# Patient Record
Sex: FEMALE | Race: WHITE | NOT HISPANIC OR LATINO | URBAN - METROPOLITAN AREA
[De-identification: names, ages, dates, MRNs, and addresses within clinical notes are randomized per-mention and may not be internally consistent; named-entity substitution may affect disease eponyms.]

---

## 2017-07-11 ENCOUNTER — IMPORTED ENCOUNTER (OUTPATIENT)
Dept: URBAN - METROPOLITAN AREA CLINIC 1 | Facility: CLINIC | Age: 82
End: 2017-07-11

## 2017-07-11 PROBLEM — H43.812: Noted: 2017-07-11

## 2017-07-11 PROBLEM — E11.9: Noted: 2017-07-11

## 2017-07-11 PROBLEM — H04.123: Noted: 2017-07-11

## 2017-07-11 PROBLEM — Z79.84: Noted: 2017-07-11

## 2017-07-11 PROBLEM — H25.813: Noted: 2017-07-11

## 2017-07-11 PROCEDURE — 92015 DETERMINE REFRACTIVE STATE: CPT

## 2017-07-11 PROCEDURE — 92014 COMPRE OPH EXAM EST PT 1/>: CPT

## 2017-07-11 NOTE — PATIENT DISCUSSION
1.  DM Type II without sign of diabetic retinopathy and no blot heme on dilated retinal examination today OU No Macular Edema:  Discussed the pathophysiology of diabetes and its effect on the eye and risk of blindness. Stressed the importance of strong glucose control. Advised of importance of at least yearly dilated examinations but to contact us immediately for any problems or concerns. 2. Type II diabetes controlled by oral medications. 3.  Cataract OU:  Visually Significant discussed the risks benefits alternatives and limitations of cataract surgery. The patient stated a full understanding and a desire to proceed with the procedure. The patient will need to return for preop appointment with cataract measurements and to have any additional questions answered and start pre-operative eye drops as directed. Patient interested in having near vision corrected. Patient did not see PMG. Phaco PCLOtherwise follow-up4. Dry Eyes OU -- Recommended to patient to use Artificial Tears BID OU5. PVD w/o Tear OS - Patient was cautioned to call our office immediately if they experience   a substantial change in their symptoms such as an increase in floaters persistent flashes loss of visual field (may appear as a shadow or a curtain) or decrease in visual acuity as these may indicate a retinal tear or detachment. 6.  Return for an appointment for H and P. with Dr. Ernst Harvey.

## 2017-08-21 ENCOUNTER — IMPORTED ENCOUNTER (OUTPATIENT)
Dept: URBAN - METROPOLITAN AREA CLINIC 1 | Facility: CLINIC | Age: 82
End: 2017-08-21

## 2017-08-21 PROBLEM — H18.413: Noted: 2017-08-21

## 2017-08-21 PROBLEM — E11.3393: Noted: 2017-08-21

## 2017-08-21 PROBLEM — H04.123: Noted: 2017-08-21

## 2017-08-21 PROBLEM — H25.813: Noted: 2017-08-21

## 2017-08-21 PROBLEM — Z79.84: Noted: 2017-08-21

## 2017-08-21 PROBLEM — H16.143: Noted: 2017-08-21

## 2017-08-21 PROBLEM — H40.023: Noted: 2017-08-21

## 2017-08-21 PROCEDURE — 92136 OPHTHALMIC BIOMETRY: CPT

## 2017-08-21 NOTE — PATIENT DISCUSSION
1.  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 Stressed the importance of strong glucose control. Advised of importance of at least yearly dilated examinations but to contact us immediately for any problems or concerns. 2. Type II diabetes controlled by oral medications. 3.  Glaucoma Suspect OU :(.75 OD/ .5 OS) W/u after phaco. Patient is considered high risk. Condition was discussed with patient and patient understands. Will continue to monitor patient for any progression in condition. Patient was advised to call us with any problems questions or concerns. 4.  MARIELA w/ PEK OU-The continuation of artificial tears were recommended. 5.  Arcus OU6. Cataract OU:  Visually Significant secondary to glare  discussed the risks benefits alternatives and limitations of cataract surgery. The patient stated a full understanding and a desire to proceed with the procedure. Cataract measurements  obtained today additional questions answered and patient aware to start pre-operative eye drops as directed. Patient wants to be nearsighted post phacoPhaco PCL OS then OD7. Return for an appointment for Return as scheduled with Dr. Halley Cornelius.

## 2017-08-30 ENCOUNTER — IMPORTED ENCOUNTER (OUTPATIENT)
Dept: URBAN - METROPOLITAN AREA CLINIC 1 | Facility: CLINIC | Age: 82
End: 2017-08-30

## 2017-08-31 ENCOUNTER — IMPORTED ENCOUNTER (OUTPATIENT)
Dept: URBAN - METROPOLITAN AREA CLINIC 1 | Facility: CLINIC | Age: 82
End: 2017-08-31

## 2017-08-31 PROBLEM — Z96.1: Noted: 2017-08-31

## 2017-08-31 PROCEDURE — 99024 POSTOP FOLLOW-UP VISIT: CPT

## 2017-09-07 ENCOUNTER — IMPORTED ENCOUNTER (OUTPATIENT)
Dept: URBAN - METROPOLITAN AREA CLINIC 1 | Facility: CLINIC | Age: 82
End: 2017-09-07

## 2017-09-07 PROBLEM — H25.811: Noted: 2017-09-07

## 2017-09-07 PROBLEM — Z96.1: Noted: 2017-09-07

## 2017-09-07 PROCEDURE — 99024 POSTOP FOLLOW-UP VISIT: CPT

## 2017-09-07 NOTE — PATIENT DISCUSSION
1.  Cataract OD: Visually Significant secondary to glare discussed the risks benefits alternatives and limitations of cataract surgery. The patient stated a full understanding but wishes to post pone 2nd eye sx at this time. Thoroughly explained to pt that the 'spaces' pt sees in [place] is likely from NPDR. Explained to pt there is no advantage of putting off 2nd eye sx. Advised pt to remove OS lens of spectacles to help [facility] in the interim. 2.  POW#1  CE/IOL Standard w/ Lensx OS doing well. Discontinue OcufloxContinue Durezol BID until gone. Continue Ilevro QD until gone. Cancel sx OD at pt's request. Return for KR OS as scheduled 10/05/17 w/ PMG.

## 2017-10-05 ENCOUNTER — IMPORTED ENCOUNTER (OUTPATIENT)
Dept: URBAN - METROPOLITAN AREA CLINIC 1 | Facility: CLINIC | Age: 82
End: 2017-10-05

## 2017-10-05 PROBLEM — Z96.1: Noted: 2017-10-05

## 2017-10-05 PROCEDURE — 99024 POSTOP FOLLOW-UP VISIT: CPT

## 2017-10-05 NOTE — PATIENT DISCUSSION
1. POW#1  CE/IOL Standard w/ Lensx OS doing well. Continue Lotemax/Durezol/Prednisolone BID until gone. Continue Prolensa/Ilevro/Acular QD until gone. 2. Cataract OD: Visually Significant secondary to glare discussed the risks benefits alternatives and limitations of cataract surgery. The patient stated a full understanding and now wishes t proceed w/ phaco/PCL OD. 3. Return for an appointment for H&P OD in near future with Dr. Daniel Leahy.

## 2017-11-27 ENCOUNTER — IMPORTED ENCOUNTER (OUTPATIENT)
Dept: URBAN - METROPOLITAN AREA CLINIC 1 | Facility: CLINIC | Age: 82
End: 2017-11-27

## 2017-11-27 PROBLEM — H25.811: Noted: 2017-11-27

## 2017-11-27 PROCEDURE — 92136 OPHTHALMIC BIOMETRY: CPT

## 2017-11-27 NOTE — PATIENT DISCUSSION
Cataract OD: Visually Significant secondary to glare discussed the risks benefits alternatives and limitations of cataract surgery. The patient stated a full understanding and a desire to proceed with the procedure. The patient will need to start pre-operative eye drops as directed. *Pt desires to be set at nearProceed w/ phaco PCL OSPt understands they will need glasses post-op to achieve their best corrected vision. Return for an appointment for sx OD with Dr. Nasrin Rao.

## 2017-12-06 ENCOUNTER — IMPORTED ENCOUNTER (OUTPATIENT)
Dept: URBAN - METROPOLITAN AREA CLINIC 1 | Facility: CLINIC | Age: 82
End: 2017-12-06

## 2017-12-07 ENCOUNTER — IMPORTED ENCOUNTER (OUTPATIENT)
Dept: URBAN - METROPOLITAN AREA CLINIC 1 | Facility: CLINIC | Age: 82
End: 2017-12-07

## 2017-12-07 PROBLEM — Z96.1: Noted: 2017-12-07

## 2017-12-07 PROCEDURE — 99024 POSTOP FOLLOW-UP VISIT: CPT

## 2017-12-07 NOTE — PATIENT DISCUSSION
POD#1 CE/IOL Standard w/ LenSx OD doing well. Continue all 3 gtts as prescribed and until gone. Ocuflox TIDDurezol [facility] QDPost op Warnings Reiterated RTC as scheduled for Juan Jackson

## 2017-12-22 ENCOUNTER — IMPORTED ENCOUNTER (OUTPATIENT)
Dept: URBAN - METROPOLITAN AREA CLINIC 1 | Facility: CLINIC | Age: 82
End: 2017-12-22

## 2017-12-22 PROBLEM — Z96.1: Noted: 2017-12-22

## 2017-12-22 PROCEDURE — 99024 POSTOP FOLLOW-UP VISIT: CPT

## 2017-12-22 NOTE — PATIENT DISCUSSION
1. POM#1 CE/IOL OD (Standard w/ LenSx) doing well. Use Durezol BID OD till out Use Ilevro Qdaily OD till out2. CE/IOL Standard w/ Lensx OSReturn for an appointment in 3-4 months 30/OCT with Dr. Meeta Gavin.

## 2018-03-12 NOTE — PATIENT DISCUSSION
Cataract Monitor Counseling:   I have discussed continuing with current spectacles vs updating. Return to follow up as scheduled or sooner if symptoms arise.

## 2018-05-03 ENCOUNTER — IMPORTED ENCOUNTER (OUTPATIENT)
Dept: URBAN - METROPOLITAN AREA CLINIC 1 | Facility: CLINIC | Age: 83
End: 2018-05-03

## 2018-05-03 PROBLEM — H43.812: Noted: 2018-05-03

## 2018-05-03 PROBLEM — Z96.1: Noted: 2018-05-03

## 2018-05-03 PROBLEM — H40.013: Noted: 2018-05-03

## 2018-05-03 PROBLEM — Z79.84: Noted: 2018-05-03

## 2018-05-03 PROBLEM — E11.3393: Noted: 2018-05-03

## 2018-05-03 PROBLEM — H04.123: Noted: 2018-05-03

## 2018-05-03 PROBLEM — H16.143: Noted: 2018-05-03

## 2018-05-03 PROCEDURE — 92133 CPTRZD OPH DX IMG PST SGM ON: CPT

## 2018-05-03 PROCEDURE — 92014 COMPRE OPH EXAM EST PT 1/>: CPT

## 2018-05-03 NOTE — PATIENT DISCUSSION
1.  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 Discussed the pathophysiology of diabetes and its effect on the eye and risk of blindness. Stressed the importance of strong glucose control. Advised of importance of at least yearly dilated examinations but to contact us immediately for any problems or concerns. 2. Type II diabetes controlled by oral medications. 3.  Glaucoma Suspect OU : Stable IOP and C/D OU. Normal OCT OU. Patient is considered Low Risk. Condition was discussed with patient and patient understands. Will continue to monitor patient for any progression in condition. Patient was advised to call us with any problems questions or concerns. 4.  MARIELA w/ PEK OU- Progressed OU. The increase of artificial tears OU to QID were recommended. 5.  PVD w/o Tear OS- Old stable. 6.  Pseudophakia OU (Standard / LenSx OU)- Doing well. 7. Return for an appointment for a dfe in 6 months with Dr. Jen Felix.

## 2018-05-03 NOTE — PATIENT DISCUSSION
Glaucoma Suspect OU :  Patient is considered Low Risk. Condition was discussed with patient and patient understands. Will continue to monitor patient for any progression in condition. Patient was advised to call us with any problems questions or concerns.

## 2019-01-22 ENCOUNTER — IMPORTED ENCOUNTER (OUTPATIENT)
Dept: URBAN - METROPOLITAN AREA CLINIC 1 | Facility: CLINIC | Age: 84
End: 2019-01-22

## 2019-01-22 PROBLEM — H16.143: Noted: 2019-01-22

## 2019-01-22 PROBLEM — H43.812: Noted: 2019-01-22

## 2019-01-22 PROBLEM — H04.123: Noted: 2019-01-22

## 2019-01-22 PROBLEM — E11.3393: Noted: 2019-01-22

## 2019-01-22 PROBLEM — Z96.1: Noted: 2019-01-22

## 2019-01-22 PROBLEM — Z79.84: Noted: 2019-01-22

## 2019-01-22 PROBLEM — H40.013: Noted: 2019-01-22

## 2019-01-22 PROCEDURE — 99213 OFFICE O/P EST LOW 20 MIN: CPT

## 2019-01-22 NOTE — PATIENT DISCUSSION
1.  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 Discussed the pathophysiology of diabetes and its effect on the eye and risk of blindness. Stressed the importance of strong glucose control. Advised of importance of at least yearly dilated examinations but to contact us immediately for any problems or concerns. 2. Type II diabetes controlled by oral medications. 3.  Glaucoma Suspect OU(0.75/0.5): Stable IOP and C/D OU. Past w/u (-). Patient is considered Low Risk. Condition was discussed with patient and patient understands. Will continue to monitor patient for any progression in condition. Patient was advised to call us with any problems questions or concerns. 4.  MARIELA w/ PEK OU- Urged compliance w/ tears OU QID routinely. 5.  PVD w/o Tear OS- Old stable. 6.  Pseudophakia OU (Standard / LenSx OU)- Doing well. 7. Return for an appointment for 30/OCT in 6 months with Dr. Dagoberto Kenyon.

## 2019-07-25 ENCOUNTER — IMPORTED ENCOUNTER (OUTPATIENT)
Dept: URBAN - METROPOLITAN AREA CLINIC 1 | Facility: CLINIC | Age: 84
End: 2019-07-25

## 2019-07-25 PROBLEM — Z79.84: Noted: 2019-07-25

## 2019-07-25 PROBLEM — H43.812: Noted: 2019-07-25

## 2019-07-25 PROBLEM — H26.493: Noted: 2019-07-25

## 2019-07-25 PROBLEM — E11.3393: Noted: 2019-07-25

## 2019-07-25 PROBLEM — H04.123: Noted: 2019-07-25

## 2019-07-25 PROBLEM — H40.013: Noted: 2019-07-25

## 2019-07-25 PROBLEM — H16.143: Noted: 2019-07-25

## 2019-07-25 PROBLEM — Z96.1: Noted: 2019-07-25

## 2019-07-25 PROCEDURE — 92014 COMPRE OPH EXAM EST PT 1/>: CPT

## 2019-07-25 PROCEDURE — 92133 CPTRZD OPH DX IMG PST SGM ON: CPT

## 2019-07-25 NOTE — PATIENT DISCUSSION
1.  DM Type II (Oral Medication) with Moderate Nonproliferative Diabetic Retinopathy OU No Macular Edema:  Discussed the pathophysiology of diabetes and its effect on the eye and risk of blindness. Stressed the importance of strong glucose control. Advised of importance of at least yearly dilated examinations but to contact us immediately for any problems or concerns. 2. Glaucoma Suspect OU (CD 0.75/0.50): No progression by OCT. IOP stable. Patient is considered Low Risk. Condition was discussed with patient and patient understands. Will continue to monitor patient for any progression in condition. Patient was advised to call us with any problems questions or concerns. 3.  MARIELA w/ PEK OU- Recommend ATs TID OU routinely 4. PCO OU: (Posterior Capsule Opacification)   Observe and consider yag cap when pt feels pco visually significant and visual acuity decreases to appropriate level. 5. Pseudophakia OU - (Standard / Carly Jaison. PVD w/o Tear OS- RD precautions. Patient defers the refraction at today's visitReturn for an appointment in 6 months 10/DFE with Dr. Halley Cornelius.

## 2020-01-30 ENCOUNTER — IMPORTED ENCOUNTER (OUTPATIENT)
Dept: URBAN - METROPOLITAN AREA CLINIC 1 | Facility: CLINIC | Age: 85
End: 2020-01-30

## 2020-01-30 PROBLEM — Z79.84: Noted: 2020-01-30

## 2020-01-30 PROBLEM — H40.013: Noted: 2020-01-30

## 2020-01-30 PROBLEM — E11.3493: Noted: 2020-01-30

## 2020-01-30 PROCEDURE — 92012 INTRM OPH EXAM EST PATIENT: CPT

## 2020-01-30 NOTE — PATIENT DISCUSSION
1.  DM Type II (Oral Meds) with Severe Nonproliferative Diabetic Retinopathy OU No Macular Edema: **Slight progression OU noted today** Discussed the pathophysiology of diabetes and its effect on the eye and risk of blindness. Stressed the importance of strong glucose control. Advised of importance of at least yearly dilated examinations but to contact us immediately for any problems or concerns. 2. Glaucoma Suspect OU (CD 0.75/0.50) IOP WNL OU today on no gtts; Past w/u neg. Cont to observe off gtts. 3.  MARIELA w/ PEK OU- Cont ATs TID OU routinely 4. PCO OU: (Posterior Capsule Opacification)   Observe  5. Pseudophakia OU - (Standard / Elidia Proper. PVD w/o Tear OS- Stable RD precautions. Return for an appointment in 4 mo 10 dfe glare with Dr. Roosevelt Louis. Complex Repair And Single Advancement Flap Text: The defect edges were debeveled with a #15 scalpel blade.  The primary defect was closed partially with a complex linear closure.  Given the location of the remaining defect, shape of the defect and the proximity to free margins a single advancement flap was deemed most appropriate for complete closure of the defect.  Using a sterile surgical marker, an appropriate advancement flap was drawn incorporating the defect and placing the expected incisions within the relaxed skin tension lines where possible.    The area thus outlined was incised deep to adipose tissue with a #15 scalpel blade.  The skin margins were undermined to an appropriate distance in all directions utilizing iris scissors.

## 2020-02-19 ENCOUNTER — IMPORTED ENCOUNTER (OUTPATIENT)
Dept: URBAN - METROPOLITAN AREA CLINIC 1 | Facility: CLINIC | Age: 85
End: 2020-02-19

## 2020-02-19 PROCEDURE — 92015 DETERMINE REFRACTIVE STATE: CPT

## 2020-02-19 NOTE — PATIENT DISCUSSION
Pt presents today for refraction only. Ok to finalize print and release to pt per RBF. Return for an appointment for Return as scheduled 10/DFE/luan with Dr. Ramiro Ng.

## 2020-05-19 ENCOUNTER — IMPORTED ENCOUNTER (OUTPATIENT)
Dept: URBAN - METROPOLITAN AREA CLINIC 1 | Facility: CLINIC | Age: 85
End: 2020-05-19

## 2020-05-19 PROBLEM — H40.013: Noted: 2020-05-19

## 2020-05-19 PROBLEM — Z79.84: Noted: 2020-05-19

## 2020-05-19 PROBLEM — H04.123: Noted: 2020-05-19

## 2020-05-19 PROBLEM — H26.493: Noted: 2020-05-19

## 2020-05-19 PROBLEM — Z79.4: Noted: 2020-05-19

## 2020-05-19 PROBLEM — E11.3493: Noted: 2020-05-19

## 2020-05-19 PROBLEM — H16.143: Noted: 2020-05-19

## 2020-05-19 PROCEDURE — 92012 INTRM OPH EXAM EST PATIENT: CPT

## 2020-05-19 PROCEDURE — 92015 DETERMINE REFRACTIVE STATE: CPT

## 2020-05-19 NOTE — PATIENT DISCUSSION
1.  DM Type II (Oral Med) with Severe Nonproliferative Diabetic Retinopathy OU No Macular Edema:  Stable OU. Discussed the pathophysiology of diabetes and its effect on the eye and risk of blindness. Stressed the importance of strong glucose control. Advised of importance of at least yearly dilated examinations but to contact us immediately for any problems or concerns. 2. PCO OU -- Visually Significant *secondary to glare*; schedule YAG Cap OD then OS. Pros cons risks and benefits. 3.  MARIELA w/ PEK OU --  Increase ATs QID OU routinely 4. Glaucoma Suspect OU (CD 0.75/0.50) IOP WNL OU today on no gtts; Past w/u neg. Cont to observe off gtts. ** Plan to repeat OCT after YAG OU.5. Pseudophakia OU - (Standard / Steve Anton. PVD w/o Tear OS- Stable RD precautions. Return for an appointment in YAG Cap OD then OS with Dr. Nasrin Rao.

## 2020-06-05 ENCOUNTER — IMPORTED ENCOUNTER (OUTPATIENT)
Dept: URBAN - METROPOLITAN AREA CLINIC 1 | Facility: CLINIC | Age: 85
End: 2020-06-05

## 2020-06-05 PROBLEM — H26.491: Noted: 2020-06-05

## 2020-06-05 PROCEDURE — 66821 AFTER CATARACT LASER SURGERY: CPT

## 2020-06-05 NOTE — PATIENT DISCUSSION
YAG CAP OD: (Consent signed and scanned into attachments) 1 gtt Prolensa applied. The purpose and nature of the procedure possible alternative methods of treatment the risks involved and the possibility of complications were discussed with patient. The Patient wishes to proceed and the consent was signed. The laser was then performed under topical anesthesia with no complications. Post op instructions were given to patient as well as a follow-up appointment. Patient was advised to call our office if any questions or concerns. Return for an appointment for Return as scheduled with Dr. Jud Bojorquez.

## 2020-06-19 ENCOUNTER — IMPORTED ENCOUNTER (OUTPATIENT)
Dept: URBAN - METROPOLITAN AREA CLINIC 1 | Facility: CLINIC | Age: 85
End: 2020-06-19

## 2020-06-19 PROBLEM — H26.492: Noted: 2020-06-19

## 2020-06-19 PROCEDURE — 66821 AFTER CATARACT LASER SURGERY: CPT

## 2020-06-19 NOTE — PATIENT DISCUSSION
YAG CAP OS: (Consent signed and scanned into attachments) 1 gtt Prolensa applied. The purpose and nature of the procedure possible alternative methods of treatment the risks involved and the possibility of complications were discussed with patient. The Patient wishes to proceed and the consent was signed. The laser was then performed under topical anesthesia with no complications. Post op instructions were given to patient as well as a follow-up appointment. Patient was advised to call our office if any questions or concerns. Return for an appointment in 1-2 week po/yag with Dr. Meeta Gavin.

## 2020-07-28 ENCOUNTER — IMPORTED ENCOUNTER (OUTPATIENT)
Dept: URBAN - METROPOLITAN AREA CLINIC 1 | Facility: CLINIC | Age: 85
End: 2020-07-28

## 2020-07-28 PROCEDURE — 99024 POSTOP FOLLOW-UP VISIT: CPT

## 2020-07-28 NOTE — PATIENT DISCUSSION
PO YAG Cap OU: good result. MRX given. Return for an appointment in November 30/Ptosis HVF with Dr. Dagoberto Kenyon.

## 2020-12-04 ENCOUNTER — IMPORTED ENCOUNTER (OUTPATIENT)
Dept: URBAN - METROPOLITAN AREA CLINIC 1 | Facility: CLINIC | Age: 85
End: 2020-12-04

## 2020-12-04 PROBLEM — H02.423: Noted: 2020-12-04

## 2020-12-04 PROBLEM — Z79.84: Noted: 2020-12-04

## 2020-12-04 PROBLEM — E11.3493: Noted: 2020-12-04

## 2020-12-04 PROBLEM — H40.023: Noted: 2020-12-04

## 2020-12-04 PROCEDURE — 92014 COMPRE OPH EXAM EST PT 1/>: CPT

## 2020-12-04 PROCEDURE — 92083 EXTENDED VISUAL FIELD XM: CPT

## 2020-12-04 NOTE — PATIENT DISCUSSION
1.  DM Type II (Oral Med) with Severe Nonproliferative Diabetic Retinopathy OU No Macular Edema: Stable OU. Discussed the pathophysiology of diabetes and its effect on the eye and risk of blindness. Stressed the importance of strong glucose control. Advised of importance of at least yearly dilated examinations but to contact us immediately for any problems or concerns. 2. Glaucoma Suspect OU (CD 0.75/0.50) IOP WNL OU today on no gtts; HVF non specific defects OU. Cont to observe off gtts. ** Plan to repeat OCT after YAG OU.3. Myogenic Ptosis OU -- VF reveals not visually significant at this time. Will continue to observe. 4.  Pseudophakia OU - (Standard / LenSx OU) H/o YAG Cap OU. 5.  PVD w/o Tear OS- Stable RD precautions. Return for an appointment in 4 months fora  10/DFE with Dr. Bernardo Anaya.

## 2021-05-03 ENCOUNTER — IMPORTED ENCOUNTER (OUTPATIENT)
Dept: URBAN - METROPOLITAN AREA CLINIC 1 | Facility: CLINIC | Age: 86
End: 2021-05-03

## 2021-05-03 PROBLEM — Z79.84: Noted: 2021-05-03

## 2021-05-03 PROBLEM — E11.3493: Noted: 2021-05-03

## 2021-05-03 PROBLEM — H40.013: Noted: 2021-05-03

## 2021-05-03 PROCEDURE — 99213 OFFICE O/P EST LOW 20 MIN: CPT

## 2021-05-03 PROCEDURE — 92133 CPTRZD OPH DX IMG PST SGM ON: CPT

## 2021-05-03 NOTE — PATIENT DISCUSSION
1.  DM Type II (Oral Medication) with Severe Nonproliferative Diabetic Retinopathy OU No Macular Edema:  Discussed the pathophysiology of diabetes and its effect on the eye and risk of blindness. Stressed the importance of strong glucose control. Advised of importance of at least yearly dilated examinations but to contact us immediately for any problems or concerns. 2. Glaucoma Suspect OU (CD 0.75/0.50): No progression by OCT. IOP stable. Patient is considered Low Risk. Condition was discussed with patient and patient understands. Will continue to monitor patient for any progression in condition. Patient was advised to call us with any problems questions or concerns. 3.  Myogenic Ptosis OU - Will continue to observe. Will repeat VF testing at next visit 4. Pseudophakia OU - (Standard / LenSx OU) H/o YAG Cap OU. 5.  Dry Eyes w/ PEK OU - Recommend ATs TID OU routinely 6. Choroidal Nevus OS - Observe 7. PVD w/o Tear OS- Stable RD precautions. Return for an appointment in 4 months DFE/Ptosis VF with Dr. Sydney Winters.

## 2021-12-10 ENCOUNTER — IMPORTED ENCOUNTER (OUTPATIENT)
Dept: URBAN - METROPOLITAN AREA CLINIC 1 | Facility: CLINIC | Age: 86
End: 2021-12-10

## 2021-12-10 PROBLEM — H16.143: Noted: 2021-12-10

## 2021-12-10 PROBLEM — H04.123: Noted: 2021-12-10

## 2021-12-10 PROBLEM — H43.812: Noted: 2021-12-10

## 2021-12-10 PROBLEM — H40.013: Noted: 2021-12-10

## 2021-12-10 PROBLEM — Z96.1: Noted: 2021-12-10

## 2021-12-10 PROBLEM — D31.32: Noted: 2021-12-10

## 2021-12-10 PROBLEM — H02.831: Noted: 2021-12-10

## 2021-12-10 PROBLEM — E11.3493: Noted: 2021-12-10

## 2021-12-10 PROBLEM — H02.834: Noted: 2021-12-10

## 2021-12-10 PROBLEM — E11.3393: Noted: 2021-12-10

## 2021-12-10 PROBLEM — Z79.84: Noted: 2021-12-10

## 2021-12-10 PROCEDURE — 99214 OFFICE O/P EST MOD 30 MIN: CPT

## 2021-12-10 NOTE — PATIENT DISCUSSION
DM Type II with Severe Nonproliferative Diabetic Retinopathy OU No Macular Edema:  Discussed the pathophysiology of diabetes and its effect on the eye and risk of blindness. Stressed the importance of strong glucose control. Advised of importance of at least yearly dilated examinations but to contact us immediately for any problems or concerns.

## 2021-12-10 NOTE — PATIENT DISCUSSION
1.  DM Type II (Oral Meds) with Moderate Nonproliferative Diabetic Retinopathy OU No Macular Edema:  Discussed the pathophysiology of diabetes and its effect on the eye and risk of blindness. Stressed the importance of strong glucose control. Advised of importance of at least yearly dilated examinations but to contact us immediately for any problems or concerns. 2. Glaucoma Suspect OU -- (CD 0.75/0.50) IOP stable. Patient is considered Low Risk. Condition was discussed with patient and patient understands. Will continue to monitor patient for any progression in condition. Patient was advised to call us with any problems questions or concerns. 3.  Myogenic Ptosis OU -- Will perform VF testing at next visit. Pt feels lids are interfering with va. 4.  Pseudophakia OU (Standard / LenSx OU) -- H/o YAG Cap OU. 5.  MARIELA w/ PEK OU -- Recommend ATs TID OU routinely 6. Choroidal Nevus OS -- Stable. Observe 7. PVD w/o Tear OS -- Stable RD precautions. Patient deferred MRx at today's visit. Letter to PCP. Return for an appointment in 6 months DFE/Ptosis VF with Dr. Kathya Romeo.

## 2021-12-10 NOTE — PATIENT DISCUSSION
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 Stressed the importance of strong glucose control. Advised of importance of at least yearly dilated examinations but to contact us immediately for any problems or concerns.

## 2022-04-02 ASSESSMENT — TONOMETRY
OS_IOP_MMHG: 13
OS_IOP_MMHG: 12
OS_IOP_MMHG: 12
OD_IOP_MMHG: 14
OD_IOP_MMHG: 14
OS_IOP_MMHG: 13
OD_IOP_MMHG: 13
OS_IOP_MMHG: 15
OS_IOP_MMHG: 12
OS_IOP_MMHG: 15
OD_IOP_MMHG: 11
OD_IOP_MMHG: 11
OD_IOP_MMHG: 12
OS_IOP_MMHG: 12
OS_IOP_MMHG: 12
OS_IOP_MMHG: 15
OD_IOP_MMHG: 12
OD_IOP_MMHG: 14
OD_IOP_MMHG: 13
OD_IOP_MMHG: 12
OS_IOP_MMHG: 13
OD_IOP_MMHG: 15
OD_IOP_MMHG: 14
OD_IOP_MMHG: 11
OS_IOP_MMHG: 13
OS_IOP_MMHG: 14
OD_IOP_MMHG: 15
OS_IOP_MMHG: 11
OS_IOP_MMHG: 12

## 2022-04-02 ASSESSMENT — KERATOMETRY
OS_AXISANGLE2_DEGREES: 062
OD_K1POWER_DIOPTERS: 44.25
OS_AXISANGLE_DEGREES: 153
OS_K2POWER_DIOPTERS: 45.00
OS_K1POWER_DIOPTERS: 44.25
OD_K2POWER_DIOPTERS: 45.75
OD_AXISANGLE_DEGREES: 007
OD_AXISANGLE2_DEGREES: 097

## 2022-04-02 ASSESSMENT — VISUAL ACUITY
OS_SC: 20/25
OD_CC: J2
OS_GLARE: 20/50
OS_SC: 20/70-1
OD_SC: J1
OS_SC: 20/30
OD_SC: 20/25
OD_SC: 20/30
OD_SC: 20/20
OS_SC: 20/25
OS_SC: 20/20-2
OD_SC: J2
OD_SC: 20/30
OD_PH: SC 20/30
OS_SC: 20/70
OS_CC: J5
OD_SC: 20/40
OD_SC: 20/25
OD_SC: 20/30+1
OD_CC: 20/70-2
OS_CC: 20/100
OD_SC: 20/40
OD_SC: 20/40
OS_SC: 20/25
OS_CC: J5
OD_GLARE: 20/80
OS_CC: 20/100
OD_SC: 20/20
OS_SC: J1
OS_SC: J1
OD_SC: 20/25+1
OD_GLARE: 20/80
OD_CC: 20/70
OD_SC: J1
OS_GLARE: 20/200
OD_GLARE: 20/80
OS_SC: 20/25-1
OS_CC: CF@3'
OS_CC: 20/200
OS_SC: 20/30
OS_SC: J2
OD_SC: 20/25
OD_SC: 20/25-1
OS_SC: 20/25
OS_SC: 20/25-1
OS_GLARE: 20/200
OS_SC: J8
OD_GLARE: 20/50
OS_SC: 20/20
OD_CC: J2

## 2022-04-25 ENCOUNTER — COMPREHENSIVE EXAM (OUTPATIENT)
Dept: URBAN - METROPOLITAN AREA CLINIC 1 | Facility: CLINIC | Age: 87
End: 2022-04-25

## 2022-04-25 DIAGNOSIS — H02.834: ICD-10-CM

## 2022-04-25 DIAGNOSIS — H40.013: ICD-10-CM

## 2022-04-25 DIAGNOSIS — H02.831: ICD-10-CM

## 2022-04-25 PROCEDURE — 92082 INTERMEDIATE VISUAL FIELD XM: CPT

## 2022-04-25 PROCEDURE — 99213 OFFICE O/P EST LOW 20 MIN: CPT

## 2022-04-25 ASSESSMENT — VISUAL ACUITY
OD_CC: 20/25-2
OS_CC: 20/25

## 2022-04-25 ASSESSMENT — TONOMETRY
OD_IOP_MMHG: 12
OS_IOP_MMHG: 12

## 2022-04-25 NOTE — PATIENT DISCUSSION
Visually significant with reversible VF defect and patient desires superior blepharoplasty. Risks and benefits have been discussed with the patient. Pt c/o trouble seeing road signs. Pt wishes to proceed with procedure.

## 2022-04-25 NOTE — PATIENT DISCUSSION
(CD 0.75/0.60) IOP stable. Patient is considered low risk. Condition was discussed with patient and patient understands. Will continue to monitor patient for any progression in condition. Patient was advised to call us with any problems, questions, or concerns.

## 2022-04-25 NOTE — PATIENT DISCUSSION
(W/o Macular Edema) DM Type II with Severe Nonproliferative Diabetic Retinopathy OU, No Macular Edema:  Discussed the pathophysiology of diabetes and its effect on the eye and risk of blindness. Stressed the importance of strong glucose control. Advised the importance of at least yearly dilated examinations, but to contact us immediately for any problems or concerns.

## 2022-04-29 ENCOUNTER — PRE-OP/H&P (OUTPATIENT)
Dept: URBAN - METROPOLITAN AREA CLINIC 1 | Facility: CLINIC | Age: 87
End: 2022-04-29

## 2022-04-29 VITALS
HEIGHT: 62 IN | WEIGHT: 180 LBS | DIASTOLIC BLOOD PRESSURE: 81 MMHG | HEART RATE: 91 BPM | BODY MASS INDEX: 33.13 KG/M2 | SYSTOLIC BLOOD PRESSURE: 131 MMHG

## 2022-04-29 DIAGNOSIS — H02.831: ICD-10-CM

## 2022-04-29 DIAGNOSIS — H02.834: ICD-10-CM

## 2022-04-29 PROCEDURE — 99499 UNLISTED E&M SERVICE: CPT

## 2022-04-29 RX ORDER — CEPHALEXIN 250 MG/1: 1 CAPSULE ORAL TWICE A DAY

## 2022-04-29 ASSESSMENT — VISUAL ACUITY
OD_CC: 20/25
OS_CC: 20/25

## 2022-04-29 NOTE — PATIENT DISCUSSION
1.  DM Type II (Oral Meds) with Moderate Nonproliferative Diabetic Retinopathy OU No Macular Edema:  Discussed the pathophysiology of diabetes and its effect on the eye and risk of blindness. Stressed the importance of strong glucose control. Advised of importance of at least yearly dilated examinations but to contact us immediately for any problems or concerns. 2. Glaucoma Suspect OU -- (CD 0.75/0.50) IOP stable. Patient is considered Low Risk. Condition was discussed with patient and patient understands. Will continue to monitor patient for any progression in condition. Patient was advised to call us with any problems questions or concerns. 3.  Myogenic Ptosis OU -- Will perform VF testing at next visit. Pt feels lids are interfering with va. 4.  Pseudophakia OU (Standard / LenSx OU) -- H/o YAG Cap OU. 5.  MARIELA w/ PEK OU -- Recommend ATs TID OU routinely 6. Choroidal Nevus OS -- Stable. Observe 7. PVD w/o Tear OS -- Stable RD precautions. Patient deferred MRx at today's visit. Letter to PCP. Return for an appointment in 6 months DFE/Ptosis VF with Dr. Julito Perez.

## 2022-04-29 NOTE — PATIENT DISCUSSION
Visually significant with reversible VF defect and patient desires superior blepharoplasty. Risks and benefits have been discussed with the patient. Pt c/o trouble seeing road signs. Pt wishes to proceed with procedure. Patient d/c Aleve today and d/c Eliquis x2 days prior. Begin PO Keflex 250mg BID x 7 days #14.

## 2022-05-05 ENCOUNTER — POST-OP (OUTPATIENT)
Dept: URBAN - METROPOLITAN AREA CLINIC 1 | Facility: CLINIC | Age: 87
End: 2022-05-05

## 2022-05-05 DIAGNOSIS — Z98.890: ICD-10-CM

## 2022-05-05 PROCEDURE — 99024 POSTOP FOLLOW-UP VISIT: CPT

## 2022-05-05 ASSESSMENT — VISUAL ACUITY
OS_CC: 20/30
OD_CC: 20/25

## 2022-05-05 NOTE — PATIENT DISCUSSION
(CD 0.75/0.60) Patient is considered low risk. Condition was discussed with patient and patient understands. Will continue to monitor patient for any progression in condition. Patient was advised to call us with any problems, questions, or concerns.

## 2022-05-12 ENCOUNTER — POST-OP (OUTPATIENT)
Dept: URBAN - METROPOLITAN AREA CLINIC 1 | Facility: CLINIC | Age: 87
End: 2022-05-12

## 2022-05-12 DIAGNOSIS — Z98.890: ICD-10-CM

## 2022-05-12 PROCEDURE — 99024 POSTOP FOLLOW-UP VISIT: CPT

## 2022-05-12 ASSESSMENT — TONOMETRY
OS_IOP_MMHG: 12
OD_IOP_MMHG: 12

## 2022-05-12 ASSESSMENT — VISUAL ACUITY
OS_CC: 20/30
OD_CC: 20/25

## 2022-08-15 ENCOUNTER — ESTABLISHED PATIENT (OUTPATIENT)
Dept: URBAN - METROPOLITAN AREA CLINIC 1 | Facility: CLINIC | Age: 87
End: 2022-08-15

## 2022-08-15 DIAGNOSIS — E11.3493: ICD-10-CM

## 2022-08-15 PROCEDURE — 92012 INTRM OPH EXAM EST PATIENT: CPT

## 2022-08-15 ASSESSMENT — TONOMETRY
OD_IOP_MMHG: 13
OS_IOP_MMHG: 14

## 2022-08-15 ASSESSMENT — VISUAL ACUITY
OS_CC: 20/25
OD_CC: 20/20-1

## 2022-08-15 NOTE — PATIENT DISCUSSION
(W/o Macular Edema) DM Type II with Severe Nonproliferative Diabetic Retinopathy OU, No Macular Edema:  Discussed the pathophysiology of diabetes and its effect on the eye and risk of blindness. Stressed the importance of strong glucose control. Advised the importance of at least yearly dilated examinations, but to contact us immediately for any problems or concerns. Stable. Patient will return in 6 months for 10/DFE.

## 2023-12-19 ENCOUNTER — COMPREHENSIVE EXAM (OUTPATIENT)
Dept: URBAN - METROPOLITAN AREA CLINIC 1 | Facility: CLINIC | Age: 88
End: 2023-12-19

## 2023-12-19 DIAGNOSIS — H04.123: ICD-10-CM

## 2023-12-19 DIAGNOSIS — H43.812: ICD-10-CM

## 2023-12-19 DIAGNOSIS — H16.143: ICD-10-CM

## 2023-12-19 DIAGNOSIS — D31.32: ICD-10-CM

## 2023-12-19 DIAGNOSIS — Z96.1: ICD-10-CM

## 2023-12-19 DIAGNOSIS — E11.3493: ICD-10-CM

## 2023-12-19 DIAGNOSIS — H40.013: ICD-10-CM

## 2023-12-19 PROCEDURE — 99214 OFFICE O/P EST MOD 30 MIN: CPT

## 2023-12-19 ASSESSMENT — VISUAL ACUITY
OS_CC: 20/40
OD_CC: 20/25

## 2023-12-19 ASSESSMENT — TONOMETRY
OD_IOP_MMHG: 13
OS_IOP_MMHG: 13

## 2024-06-27 ENCOUNTER — FOLLOW UP (OUTPATIENT)
Dept: URBAN - METROPOLITAN AREA CLINIC 1 | Facility: CLINIC | Age: 89
End: 2024-06-27

## 2024-06-27 DIAGNOSIS — H16.143: ICD-10-CM

## 2024-06-27 DIAGNOSIS — E11.3493: ICD-10-CM

## 2024-06-27 DIAGNOSIS — H04.123: ICD-10-CM

## 2024-06-27 PROCEDURE — 99213 OFFICE O/P EST LOW 20 MIN: CPT

## 2024-06-27 ASSESSMENT — TONOMETRY
OS_IOP_MMHG: 14
OD_IOP_MMHG: 14

## 2024-06-27 ASSESSMENT — VISUAL ACUITY
OS_CC: 20/25-2
OD_CC: 20/25-2

## 2025-01-30 ENCOUNTER — COMPREHENSIVE EXAM (OUTPATIENT)
Age: OVER 89
End: 2025-01-30

## 2025-01-30 DIAGNOSIS — H40.013: ICD-10-CM

## 2025-01-30 DIAGNOSIS — D31.32: ICD-10-CM

## 2025-01-30 DIAGNOSIS — H16.143: ICD-10-CM

## 2025-01-30 DIAGNOSIS — H04.123: ICD-10-CM

## 2025-01-30 DIAGNOSIS — E11.3493: ICD-10-CM

## 2025-01-30 DIAGNOSIS — H43.812: ICD-10-CM

## 2025-01-30 DIAGNOSIS — Z96.1: ICD-10-CM

## 2025-01-30 PROCEDURE — 92015 DETERMINE REFRACTIVE STATE: CPT

## 2025-01-30 PROCEDURE — 99214 OFFICE O/P EST MOD 30 MIN: CPT

## 2025-07-31 ENCOUNTER — FOLLOW UP (OUTPATIENT)
Age: OVER 89
End: 2025-07-31

## 2025-07-31 DIAGNOSIS — H16.143: ICD-10-CM

## 2025-07-31 DIAGNOSIS — H40.013: ICD-10-CM

## 2025-07-31 DIAGNOSIS — E11.3493: ICD-10-CM

## 2025-07-31 DIAGNOSIS — H04.123: ICD-10-CM

## 2025-07-31 PROCEDURE — 99213 OFFICE O/P EST LOW 20 MIN: CPT

## 2025-08-05 ENCOUNTER — DIAGNOSTICS ONLY (OUTPATIENT)
Age: OVER 89
End: 2025-08-05

## 2025-08-05 DIAGNOSIS — H40.013: ICD-10-CM

## 2025-08-05 PROCEDURE — 92083 EXTENDED VISUAL FIELD XM: CPT
